# Patient Record
Sex: MALE | Race: WHITE
[De-identification: names, ages, dates, MRNs, and addresses within clinical notes are randomized per-mention and may not be internally consistent; named-entity substitution may affect disease eponyms.]

---

## 2018-11-26 ENCOUNTER — HOSPITAL ENCOUNTER (EMERGENCY)
Dept: HOSPITAL 47 - EC | Age: 58
Discharge: HOME | End: 2018-11-26
Payer: COMMERCIAL

## 2018-11-26 VITALS — RESPIRATION RATE: 18 BRPM

## 2018-11-26 VITALS — SYSTOLIC BLOOD PRESSURE: 127 MMHG | TEMPERATURE: 98.8 F | DIASTOLIC BLOOD PRESSURE: 88 MMHG | HEART RATE: 97 BPM

## 2018-11-26 DIAGNOSIS — K85.90: ICD-10-CM

## 2018-11-26 DIAGNOSIS — I10: ICD-10-CM

## 2018-11-26 DIAGNOSIS — D72.829: ICD-10-CM

## 2018-11-26 DIAGNOSIS — F17.200: ICD-10-CM

## 2018-11-26 DIAGNOSIS — K52.9: Primary | ICD-10-CM

## 2018-11-26 DIAGNOSIS — K59.00: ICD-10-CM

## 2018-11-26 DIAGNOSIS — Z79.899: ICD-10-CM

## 2018-11-26 LAB
ALBUMIN SERPL-MCNC: 4.7 G/DL (ref 3.5–5)
ALP SERPL-CCNC: 78 U/L (ref 38–126)
ALT SERPL-CCNC: 27 U/L (ref 21–72)
AMYLASE SERPL-CCNC: 43 U/L (ref 30–110)
ANION GAP SERPL CALC-SCNC: 16 MMOL/L
APTT BLD: 24.6 SEC (ref 22–30)
AST SERPL-CCNC: 34 U/L (ref 17–59)
BASOPHILS # BLD AUTO: 0.1 K/UL (ref 0–0.2)
BASOPHILS NFR BLD AUTO: 1 %
BUN SERPL-SCNC: 28 MG/DL (ref 9–20)
CALCIUM SPEC-MCNC: 10.1 MG/DL (ref 8.4–10.2)
CHLORIDE SERPL-SCNC: 102 MMOL/L (ref 98–107)
CO2 SERPL-SCNC: 20 MMOL/L (ref 22–30)
EOSINOPHIL # BLD AUTO: 0.3 K/UL (ref 0–0.7)
EOSINOPHIL NFR BLD AUTO: 2 %
ERYTHROCYTE [DISTWIDTH] IN BLOOD BY AUTOMATED COUNT: 6.13 M/UL (ref 4.3–5.9)
ERYTHROCYTE [DISTWIDTH] IN BLOOD: 14 % (ref 11.5–15.5)
GLUCOSE SERPL-MCNC: 110 MG/DL (ref 74–99)
HCT VFR BLD AUTO: 53.8 % (ref 39–53)
HGB BLD-MCNC: 18.4 GM/DL (ref 13–17.5)
INR PPP: 1.1 (ref ?–1.2)
LIPASE SERPL-CCNC: 118 U/L (ref 23–300)
LYMPHOCYTES # SPEC AUTO: 2 K/UL (ref 1–4.8)
LYMPHOCYTES NFR SPEC AUTO: 16 %
MCH RBC QN AUTO: 29.9 PG (ref 25–35)
MCHC RBC AUTO-ENTMCNC: 34.1 G/DL (ref 31–37)
MCV RBC AUTO: 87.7 FL (ref 80–100)
MONOCYTES # BLD AUTO: 0.7 K/UL (ref 0–1)
MONOCYTES NFR BLD AUTO: 5 %
NEUTROPHILS # BLD AUTO: 9.7 K/UL (ref 1.3–7.7)
NEUTROPHILS NFR BLD AUTO: 75 %
PLATELET # BLD AUTO: 244 K/UL (ref 150–450)
POTASSIUM SERPL-SCNC: 3.9 MMOL/L (ref 3.5–5.1)
PROT SERPL-MCNC: 8.1 G/DL (ref 6.3–8.2)
PT BLD: 10.4 SEC (ref 9–12)
SODIUM SERPL-SCNC: 138 MMOL/L (ref 137–145)
WBC # BLD AUTO: 13.1 K/UL (ref 3.8–10.6)

## 2018-11-26 PROCEDURE — 83690 ASSAY OF LIPASE: CPT

## 2018-11-26 PROCEDURE — 80053 COMPREHEN METABOLIC PANEL: CPT

## 2018-11-26 PROCEDURE — 83605 ASSAY OF LACTIC ACID: CPT

## 2018-11-26 PROCEDURE — 99285 EMERGENCY DEPT VISIT HI MDM: CPT

## 2018-11-26 PROCEDURE — 74018 RADEX ABDOMEN 1 VIEW: CPT

## 2018-11-26 PROCEDURE — 74177 CT ABD & PELVIS W/CONTRAST: CPT

## 2018-11-26 PROCEDURE — 85730 THROMBOPLASTIN TIME PARTIAL: CPT

## 2018-11-26 PROCEDURE — 85025 COMPLETE CBC W/AUTO DIFF WBC: CPT

## 2018-11-26 PROCEDURE — 85610 PROTHROMBIN TIME: CPT

## 2018-11-26 PROCEDURE — 96360 HYDRATION IV INFUSION INIT: CPT

## 2018-11-26 PROCEDURE — 36415 COLL VENOUS BLD VENIPUNCTURE: CPT

## 2018-11-26 PROCEDURE — 96361 HYDRATE IV INFUSION ADD-ON: CPT

## 2018-11-26 PROCEDURE — 82150 ASSAY OF AMYLASE: CPT

## 2018-11-26 NOTE — ED
General Adult HPI





- General


Chief complaint: Abdominal Pain


Stated complaint: Constipation sent by ME


Time Seen by Provider: 11/26/18 11:46


Source: patient, RN notes reviewed, old records reviewed


Mode of arrival: ambulatory


Limitations: no limitations





- History of Present Illness


Initial comments: 





58-year-old male presenting for evaluation of generalized abdominal pain.  Pain 

is been intermittent over the past 3 days.  Describes the pain as 3 out of 10 

with occasional episodes progressing to 7 out of 10.  Describes a sharp and 

crampy in nature.  He does report decreased stool output over the past 3 days.  

However he has had 2 or 3 loose stools.  No vomiting, intermittent nausea and 

decreased appetite.  No fever or chills.  No chest pain.  No lower extremity 

pain.  No chest pain





- Related Data


 Home Medications











 Medication  Instructions  Recorded  Confirmed


 


Acetaminophen Tab [Tylenol Tab] 1,000 mg PO Q6HR PRN 11/26/18 11/26/18


 


Bisacodyl [Dulcolax] 5 mg PO DAILY PRN 11/26/18 11/26/18


 


Enalapril/Hydrochlorothiazide 1 tab PO BID 11/26/18 11/26/18





[Enalapril-Hctz 10-25 mg Tablet]   











 Allergies











Allergy/AdvReac Type Severity Reaction Status Date / Time


 


No Known Allergies Allergy   Verified 11/26/18 11:56














Review of Systems


ROS Statement: 


Those systems with pertinent positive or pertinent negative responses have been 

documented in the HPI.





ROS Other: All systems not noted in ROS Statement are negative.





Past Medical History


Past Medical History: Hypertension


History of Any Multi-Drug Resistant Organisms: None Reported


Past Surgical History: No Surgical Hx Reported


Past Psychological History: No Psychological Hx Reported


Smoking Status: Current every day smoker


Past Alcohol Use History: Occasional


Past Drug Use History: Marijuana





General Exam


Limitations: no limitations


General appearance: alert, in no apparent distress


Head exam: Present: atraumatic, normocephalic


Eye exam: Present: normal appearance, PERRL


ENT exam: Present: normal exam


Neck exam: Present: normal inspection.  Absent: tenderness, meningismus


Respiratory exam: Present: normal lung sounds bilaterally.  Absent: respiratory 

distress, wheezes


Cardiovascular Exam: Present: regular rate, normal rhythm


GI/Abdominal exam: Present: soft, distended.  Absent: tenderness, guarding, 

rebound


Extremities exam: Present: normal inspection, normal capillary refill.  Absent: 

pedal edema


Neurological exam: Present: alert, oriented X3, CN II-XII intact.  Absent: 

motor sensory deficit


Psychiatric exam: Present: normal affect, normal mood


Skin exam: Present: warm, intact, diaphoretic.  Absent: cyanosis





Course


 Vital Signs











  11/26/18 11/26/18 11/26/18





  11:39 11:50 13:09


 


Temperature 98.2 F  


 


Pulse Rate 113 H 106 H 100


 


Respiratory 18 18 18





Rate   


 


Blood Pressure 138/104 160/103 134/97


 


O2 Sat by Pulse 98 95 94 L





Oximetry   














Medical Decision Making





- Medical Decision Making





58-year-old male with crampy generalized abdominal pain, nausea, and several 

episodes of loose stool.  X-ray is nonspecific, no obstruction.  Patient has 

mild leukocytosis and hemoglobin of 18 likely representing some 

hemoconcentration secondary to dehydration from decreased oral intake.  

Creatinine mildly elevated above baseline 1.4, normal lactic acid.  CT obtained

, shows some mild acute pancreatitis however this is not really consistent with 

examination, there is no epigastric tenderness.  No right upper quadrant 

tenderness.  Patient does not drink alcohol.  He is reevaluated, continues to 

have no abdominal tenderness.  Vital signs are stable and improved.  He will be 

discharged with oral hydration.  Return with worsening or changing symptoms.





- Lab Data


Result diagrams: 


 11/26/18 12:21





 11/26/18 12:21


 Lab Results











  11/26/18 11/26/18 11/26/18 Range/Units





  12:21 12:21 12:21 


 


WBC   13.1 H   (3.8-10.6)  k/uL


 


RBC   6.13 H   (4.30-5.90)  m/uL


 


Hgb   18.4 H   (13.0-17.5)  gm/dL


 


Hct   53.8 H   (39.0-53.0)  %


 


MCV   87.7   (80.0-100.0)  fL


 


MCH   29.9   (25.0-35.0)  pg


 


MCHC   34.1   (31.0-37.0)  g/dL


 


RDW   14.0   (11.5-15.5)  %


 


Plt Count   244   (150-450)  k/uL


 


Neutrophils %   75   %


 


Lymphocytes %   16   %


 


Monocytes %   5   %


 


Eosinophils %   2   %


 


Basophils %   1   %


 


Neutrophils #   9.7 H   (1.3-7.7)  k/uL


 


Lymphocytes #   2.0   (1.0-4.8)  k/uL


 


Monocytes #   0.7   (0-1.0)  k/uL


 


Eosinophils #   0.3   (0-0.7)  k/uL


 


Basophils #   0.1   (0-0.2)  k/uL


 


PT     (9.0-12.0)  sec


 


INR     (<1.2)  


 


APTT     (22.0-30.0)  sec


 


Sodium  138    (137-145)  mmol/L


 


Potassium  3.9    (3.5-5.1)  mmol/L


 


Chloride  102    ()  mmol/L


 


Carbon Dioxide  20 L    (22-30)  mmol/L


 


Anion Gap  16    mmol/L


 


BUN  28 H    (9-20)  mg/dL


 


Creatinine  1.42 H    (0.66-1.25)  mg/dL


 


Est GFR (CKD-EPI)AfAm  64    (>60 ml/min/1.73 sqM)  


 


Est GFR (CKD-EPI)NonAf  55    (>60 ml/min/1.73 sqM)  


 


Glucose  110 H    (74-99)  mg/dL


 


Plasma Lactic Acid Jay    1.3  (0.7-2.0)  mmol/L


 


Calcium  10.1    (8.4-10.2)  mg/dL


 


Total Bilirubin  1.0    (0.2-1.3)  mg/dL


 


AST  34    (17-59)  U/L


 


ALT  27    (21-72)  U/L


 


Alkaline Phosphatase  78    ()  U/L


 


Total Protein  8.1    (6.3-8.2)  g/dL


 


Albumin  4.7    (3.5-5.0)  g/dL


 


Amylase  43    ()  U/L


 


Lipase  118    ()  U/L














  11/26/18 Range/Units





  12:21 


 


WBC   (3.8-10.6)  k/uL


 


RBC   (4.30-5.90)  m/uL


 


Hgb   (13.0-17.5)  gm/dL


 


Hct   (39.0-53.0)  %


 


MCV   (80.0-100.0)  fL


 


MCH   (25.0-35.0)  pg


 


MCHC   (31.0-37.0)  g/dL


 


RDW   (11.5-15.5)  %


 


Plt Count   (150-450)  k/uL


 


Neutrophils %   %


 


Lymphocytes %   %


 


Monocytes %   %


 


Eosinophils %   %


 


Basophils %   %


 


Neutrophils #   (1.3-7.7)  k/uL


 


Lymphocytes #   (1.0-4.8)  k/uL


 


Monocytes #   (0-1.0)  k/uL


 


Eosinophils #   (0-0.7)  k/uL


 


Basophils #   (0-0.2)  k/uL


 


PT  10.4  (9.0-12.0)  sec


 


INR  1.1  (<1.2)  


 


APTT  24.6  (22.0-30.0)  sec


 


Sodium   (137-145)  mmol/L


 


Potassium   (3.5-5.1)  mmol/L


 


Chloride   ()  mmol/L


 


Carbon Dioxide   (22-30)  mmol/L


 


Anion Gap   mmol/L


 


BUN   (9-20)  mg/dL


 


Creatinine   (0.66-1.25)  mg/dL


 


Est GFR (CKD-EPI)AfAm   (>60 ml/min/1.73 sqM)  


 


Est GFR (CKD-EPI)NonAf   (>60 ml/min/1.73 sqM)  


 


Glucose   (74-99)  mg/dL


 


Plasma Lactic Acid Jay   (0.7-2.0)  mmol/L


 


Calcium   (8.4-10.2)  mg/dL


 


Total Bilirubin   (0.2-1.3)  mg/dL


 


AST   (17-59)  U/L


 


ALT   (21-72)  U/L


 


Alkaline Phosphatase   ()  U/L


 


Total Protein   (6.3-8.2)  g/dL


 


Albumin   (3.5-5.0)  g/dL


 


Amylase   ()  U/L


 


Lipase   ()  U/L














Disposition


Clinical Impression: 


 Abdominal pain, Gastroenteritis





Disposition: HOME SELF-CARE


Condition: Good


Instructions:  Abdominal Pain (ED), Acute Nausea and Vomiting (ED)


Is patient prescribed a controlled substance at d/c from ED?: No


Referrals: 


None,Stated [Primary Care Provider] - 1-2 days


Rene Ivory MD [REFERRING] - 1-2 days


Time of Disposition: 13:57

## 2018-11-26 NOTE — XR
EXAMINATION TYPE: XR KUB

 

DATE OF EXAM: 11/26/2018 12:47 PM

 

CLINICAL HISTORY:  Abdominal pain and constipation for 4 days

 

TECHNIQUE: Single upright image of the abdomen is obtained.

 

COMPARISON: None.

 

FINDINGS: Paucity of bowel gas is seen in the distal colon and rectum. Scattered gas is seen in nondi
lated small bowel loops. Gas and fecal material is seen in nondilated colon. There is no visceromegal
y, pneumoperitoneum, or abnormal calcification appreciated. The lung bases are clear and the osseous 
structures are intact. Probable small hiatal hernia seen.

 

IMPRESSION: Although there is a paucity of distal colonic and rectal stool/bowel gas no proximal wilfredo
l dilatation is seen to suggest obstruction.

## 2018-11-26 NOTE — CT
EXAMINATION TYPE: CT abdomen pelvis w con

 

DATE OF EXAM: 11/26/2018

 

COMPARISON: NONE

 

HISTORY: 58-year-old male pain and constipation for 3 days

 

TECHNIQUE: Contiguous axial scanning of the abdomen and pelvis following administration of 100 ml Iso
otto

300 IV contrast.  Delayed images through the kidneys and coronal/sagittal reconstructions performed.

 

CT DLP: 1617.4 mGycm

Automated exposure control for dose reduction was used.

 

 

FINDINGS: Heart normal size with small pericardial fluid. Lung bases clear without pleural effusion.

 

Small hiatal hernia.

 

Liver mildly enlarged at 18.6 cm. Slightly low attenuation may reflect fatty infiltration. Portal berny
ous system is patent. No biliary ductal dilatation.

 

Gallbladder, adrenal glands, kidneys, spleen with anterior splenule within normal limits.

 

There is focal fat stranding along the inferior aspect of the pancreatic head, refer to axial images 
46 through 50 and coronal image 50. Adjacent mild circumferential wall thickening of the second porti
on of the duodenum likely reactive inflammation.

 

No mesenteric or retroperitoneal lymphadenopathy. No dilated small bowel, free fluid, or free air. No
 abnormal fluid collection.

 

Normal appendix. No significant stool burden. No pericolonic inflammatory change.

 

Bladder urine distended. Central prostatic calcifications. Prostate gland measures 4.1 cm wide. No ab
normal fluid collection in the pelvis or pelvic lymphadenopathy.

 

Bones: Degenerative disc disease L5-S1. Mild degenerative disc disease at additional levels in the th
oracic spine. Facet arthropathy lower lumbar spine.

 

 

 

IMPRESSION: 

 

1. EDEMATOUS CHANGE WITH FOCAL FAT STRANDING ALONG THE INFERIOR PANCREATIC HEAD SUGGESTS MILD ACUTE P
ANCREATITIS. NO ABNORMAL FLUID COLLECTION. FOLLOW-UP AFTER SUCCESSFUL TREATMENT RECOMMENDED.

2. MILD HEPATOMEGALY (18.6 CM) WITH POSSIBLE MILD FATTY INFILTRATION.

3. SMALL HIATAL HERNIA.

## 2019-04-12 ENCOUNTER — HOSPITAL ENCOUNTER (OUTPATIENT)
Dept: HOSPITAL 47 - LABWHC1 | Age: 59
Discharge: HOME | End: 2019-04-12
Attending: INTERNAL MEDICINE
Payer: COMMERCIAL

## 2019-04-12 DIAGNOSIS — R53.83: ICD-10-CM

## 2019-04-12 DIAGNOSIS — I10: Primary | ICD-10-CM

## 2019-04-12 LAB
ALBUMIN SERPL-MCNC: 4.5 G/DL (ref 3.8–4.9)
ALBUMIN/GLOB SERPL: 2.05 G/DL (ref 1.6–3.17)
ALP SERPL-CCNC: 79 U/L (ref 41–126)
ALT SERPL-CCNC: 25 U/L (ref 10–49)
ANION GAP SERPL CALC-SCNC: 5.1 MMOL/L (ref 4–12)
AST SERPL-CCNC: 23 U/L (ref 14–35)
BASOPHILS # BLD AUTO: 0.1 K/UL (ref 0–0.2)
BASOPHILS NFR BLD AUTO: 1 %
BUN SERPL-SCNC: 17 MG/DL (ref 9–27)
CALCIUM SPEC-MCNC: 9.2 MG/DL (ref 8.7–10.3)
CHLORIDE SERPL-SCNC: 104 MMOL/L (ref 96–109)
CO2 SERPL-SCNC: 26.9 MMOL/L (ref 21.6–31.8)
EOSINOPHIL # BLD AUTO: 0.9 K/UL (ref 0–0.7)
EOSINOPHIL NFR BLD AUTO: 8 %
ERYTHROCYTE [DISTWIDTH] IN BLOOD BY AUTOMATED COUNT: 5.55 M/UL (ref 4.3–5.9)
ERYTHROCYTE [DISTWIDTH] IN BLOOD: 13.9 % (ref 11.5–15.5)
GLOBULIN SER CALC-MCNC: 2.2 G/DL (ref 1.6–3.3)
GLUCOSE SERPL-MCNC: 89 MG/DL (ref 70–110)
HCT VFR BLD AUTO: 49.8 % (ref 39–53)
HGB BLD-MCNC: 15.9 GM/DL (ref 13–17.5)
LYMPHOCYTES # SPEC AUTO: 2.8 K/UL (ref 1–4.8)
LYMPHOCYTES NFR SPEC AUTO: 25 %
MCH RBC QN AUTO: 28.7 PG (ref 25–35)
MCHC RBC AUTO-ENTMCNC: 32 G/DL (ref 31–37)
MCV RBC AUTO: 89.7 FL (ref 80–100)
MONOCYTES # BLD AUTO: 0.7 K/UL (ref 0–1)
MONOCYTES NFR BLD AUTO: 6 %
NEUTROPHILS # BLD AUTO: 6.2 K/UL (ref 1.3–7.7)
NEUTROPHILS NFR BLD AUTO: 57 %
PLATELET # BLD AUTO: 260 K/UL (ref 150–450)
POTASSIUM SERPL-SCNC: 3.7 MMOL/L (ref 3.5–5.5)
PROT SERPL-MCNC: 6.7 G/DL (ref 6.2–8.2)
SODIUM SERPL-SCNC: 136 MMOL/L (ref 135–145)
WBC # BLD AUTO: 10.9 K/UL (ref 3.8–10.6)

## 2019-04-12 PROCEDURE — 36415 COLL VENOUS BLD VENIPUNCTURE: CPT

## 2019-04-12 PROCEDURE — 84443 ASSAY THYROID STIM HORMONE: CPT

## 2019-04-12 PROCEDURE — 85025 COMPLETE CBC W/AUTO DIFF WBC: CPT

## 2019-04-12 PROCEDURE — 80053 COMPREHEN METABOLIC PANEL: CPT

## 2022-04-14 ENCOUNTER — HOSPITAL ENCOUNTER (OUTPATIENT)
Dept: HOSPITAL 47 - LABWHC1 | Age: 62
Discharge: HOME | End: 2022-04-14
Attending: INTERNAL MEDICINE
Payer: COMMERCIAL

## 2022-04-14 DIAGNOSIS — F52.21: ICD-10-CM

## 2022-04-14 DIAGNOSIS — I10: Primary | ICD-10-CM

## 2022-04-14 LAB
ALBUMIN SERPL-MCNC: 4.3 G/DL (ref 3.8–4.9)
ALBUMIN/GLOB SERPL: 1.64 G/DL (ref 1.6–3.17)
ALP SERPL-CCNC: 79 U/L (ref 41–126)
ALT SERPL-CCNC: 29 U/L (ref 10–49)
ANION GAP SERPL CALC-SCNC: 15 MMOL/L (ref 10–18)
AST SERPL-CCNC: 30 U/L (ref 14–35)
BASOPHILS # BLD AUTO: 0.12 X 10*3/UL (ref 0–0.1)
BASOPHILS NFR BLD AUTO: 1.5 %
BUN SERPL-SCNC: 18.8 MG/DL (ref 9–27)
BUN/CREAT SERPL: 13.33 RATIO (ref 12–20)
CALCIUM SPEC-MCNC: 9.3 MG/DL (ref 8.7–10.3)
CHLORIDE SERPL-SCNC: 101 MMOL/L (ref 96–109)
CHOLEST SERPL-MCNC: 185 MG/DL (ref 0–200)
CO2 SERPL-SCNC: 21.8 MMOL/L (ref 20–27.5)
EOSINOPHIL # BLD AUTO: 1.07 X 10*3/UL (ref 0.04–0.35)
EOSINOPHIL NFR BLD AUTO: 13.5 %
ERYTHROCYTE [DISTWIDTH] IN BLOOD BY AUTOMATED COUNT: 5.71 X 10*6/UL (ref 4.4–5.6)
ERYTHROCYTE [DISTWIDTH] IN BLOOD: 13.6 % (ref 11.5–14.5)
GLOBULIN SER CALC-MCNC: 2.6 G/DL (ref 1.6–3.3)
GLUCOSE SERPL-MCNC: 112 MG/DL (ref 70–110)
HCT VFR BLD AUTO: 52.2 % (ref 39.6–50)
HDLC SERPL-MCNC: 35.7 MG/DL (ref 40–60)
HGB BLD-MCNC: 16.5 G/DL (ref 13–17)
IMM GRANULOCYTES BLD QL AUTO: 0.4 %
LDLC SERPL CALC-MCNC: 111.3 MG/DL (ref 0–131)
LYMPHOCYTES # SPEC AUTO: 1.77 X 10*3/UL (ref 0.9–5)
LYMPHOCYTES NFR SPEC AUTO: 22.3 %
MCH RBC QN AUTO: 28.9 PG (ref 27–32)
MCHC RBC AUTO-ENTMCNC: 31.6 G/DL (ref 32–37)
MCV RBC AUTO: 91.4 FL (ref 80–97)
MONOCYTES # BLD AUTO: 0.59 X 10*3/UL (ref 0.2–1)
MONOCYTES NFR BLD AUTO: 7.4 %
NEUTROPHILS # BLD AUTO: 4.35 X 10*3/UL (ref 1.8–7.7)
NEUTROPHILS NFR BLD AUTO: 54.9 %
NRBC BLD AUTO-RTO: 0 /100 WBCS (ref 0–0)
PLATELET # BLD AUTO: 242 X 10*3/UL (ref 140–440)
POTASSIUM SERPL-SCNC: 3.6 MMOL/L (ref 3.5–5.5)
PROT SERPL-MCNC: 6.8 G/DL (ref 6.2–8.2)
SODIUM SERPL-SCNC: 137 MMOL/L (ref 135–145)
TRIGL SERPL-MCNC: 190 MG/DL (ref 0–149)
VLDLC SERPL CALC-MCNC: 38 MG/DL (ref 5–40)
WBC # BLD AUTO: 7.93 X 10*3/UL (ref 4.5–10)

## 2022-04-14 PROCEDURE — 84402 ASSAY OF FREE TESTOSTERONE: CPT

## 2022-04-14 PROCEDURE — 80061 LIPID PANEL: CPT

## 2022-04-14 PROCEDURE — 84153 ASSAY OF PSA TOTAL: CPT

## 2022-04-14 PROCEDURE — 84403 ASSAY OF TOTAL TESTOSTERONE: CPT

## 2022-04-14 PROCEDURE — 80053 COMPREHEN METABOLIC PANEL: CPT

## 2022-04-14 PROCEDURE — 85025 COMPLETE CBC W/AUTO DIFF WBC: CPT

## 2022-04-14 PROCEDURE — 36415 COLL VENOUS BLD VENIPUNCTURE: CPT
